# Patient Record
Sex: MALE | Race: WHITE | NOT HISPANIC OR LATINO | URBAN - METROPOLITAN AREA
[De-identification: names, ages, dates, MRNs, and addresses within clinical notes are randomized per-mention and may not be internally consistent; named-entity substitution may affect disease eponyms.]

---

## 2017-04-15 ENCOUNTER — EMERGENCY (EMERGENCY)
Facility: HOSPITAL | Age: 27
LOS: 1 days | Discharge: HOME | End: 2017-04-15
Attending: EMERGENCY MEDICINE | Admitting: EMERGENCY MEDICINE
Payer: COMMERCIAL

## 2017-04-15 VITALS
RESPIRATION RATE: 18 BRPM | HEART RATE: 78 BPM | OXYGEN SATURATION: 100 % | TEMPERATURE: 98 F | DIASTOLIC BLOOD PRESSURE: 81 MMHG | SYSTOLIC BLOOD PRESSURE: 120 MMHG

## 2017-04-15 DIAGNOSIS — M54.9 DORSALGIA, UNSPECIFIED: ICD-10-CM
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
## 2017-04-15 DIAGNOSIS — S30.0XXA CONTUSION OF LOWER BACK AND PELVIS, INITIAL ENCOUNTER: ICD-10-CM
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
## 2017-04-15 PROCEDURE — 99284 EMERGENCY DEPT VISIT MOD MDM: CPT

## 2017-04-15 PROCEDURE — 71101 X-RAY EXAM UNILAT RIBS/CHEST: CPT | Mod: 26

## 2017-04-15 RX ORDER — IBUPROFEN 200 MG
600 TABLET ORAL ONCE
Qty: 0 | Refills: 0 | Status: COMPLETED | OUTPATIENT
Start: 2017-04-15 | End: 2017-04-15

## 2017-04-15 RX ADMIN — Medication 600 MILLIGRAM(S): at 19:11
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
## 2017-04-15 NOTE — ED PROVIDER NOTE - NS ED MD SCRIBE ATTENDING SCRIBE SECTIONS
HISTORY OF PRESENT ILLNESS/DISPOSITION/RESULTS/VITAL SIGNS( Pullset)/INTAKE ASSESSMENT/SCREENINGS/PAST MEDICAL/SURGICAL/SOCIAL HISTORY/PROGRESS NOTE/PHYSICAL EXAM/REVIEW OF SYSTEMS/HIV/CONSULTATIONS/SHIFT CHANGE
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
## 2019-05-06 NOTE — ED ADULT NURSE NOTE - HIV INFORMATION PROVIDED
Last appt 12/21/18, last refill 4/8/19, next appt 6/4/19, discussed with Dr. Laura, per Dr. Valentín thomason to refill.   Yes

## 2022-05-21 NOTE — ED ADULT NURSE NOTE - PLAN OF CARE
Can use topical benadryl with the other medications to see if this helps reduce itching    Medications as prescribed    COVID test is pending, will result in next few hours. Please use the [facility] to obtain your results, otherwise you can call back in a few hours to get your results.    Explanation of exam/test/Call bell